# Patient Record
Sex: MALE | Race: WHITE | NOT HISPANIC OR LATINO | Employment: FULL TIME | URBAN - METROPOLITAN AREA
[De-identification: names, ages, dates, MRNs, and addresses within clinical notes are randomized per-mention and may not be internally consistent; named-entity substitution may affect disease eponyms.]

---

## 2022-09-13 ENCOUNTER — APPOINTMENT (OUTPATIENT)
Dept: RADIOLOGY | Facility: HOSPITAL | Age: 32
End: 2022-09-13
Payer: OTHER MISCELLANEOUS

## 2022-09-13 ENCOUNTER — HOSPITAL ENCOUNTER (EMERGENCY)
Facility: HOSPITAL | Age: 32
Discharge: HOME | End: 2022-09-13
Attending: EMERGENCY MEDICINE
Payer: OTHER MISCELLANEOUS

## 2022-09-13 VITALS
SYSTOLIC BLOOD PRESSURE: 137 MMHG | RESPIRATION RATE: 18 BRPM | TEMPERATURE: 98 F | OXYGEN SATURATION: 100 % | DIASTOLIC BLOOD PRESSURE: 89 MMHG | HEART RATE: 103 BPM

## 2022-09-13 DIAGNOSIS — M54.9 UPPER BACK PAIN: Primary | ICD-10-CM

## 2022-09-13 DIAGNOSIS — M54.6 THORACIC BACK PAIN: ICD-10-CM

## 2022-09-13 DIAGNOSIS — R13.10 DYSPHAGIA: ICD-10-CM

## 2022-09-13 PROCEDURE — 96372 THER/PROPH/DIAG INJ SC/IM: CPT

## 2022-09-13 PROCEDURE — 71046 X-RAY EXAM CHEST 2 VIEWS: CPT

## 2022-09-13 PROCEDURE — 99283 EMERGENCY DEPT VISIT LOW MDM: CPT

## 2022-09-13 PROCEDURE — 99284 EMERGENCY DEPT VISIT MOD MDM: CPT | Performed by: EMERGENCY MEDICINE

## 2022-09-13 RX ORDER — KETOROLAC TROMETHAMINE 30 MG/ML
15 INJECTION, SOLUTION INTRAMUSCULAR; INTRAVENOUS ONCE
Status: COMPLETED | OUTPATIENT
Start: 2022-09-13 | End: 2022-09-13

## 2022-09-13 RX ORDER — IBUPROFEN 800 MG/1
800 TABLET ORAL 3 TIMES DAILY
Qty: 21 TABLET | Refills: 0 | Status: SHIPPED | OUTPATIENT
Start: 2022-09-13

## 2022-09-13 RX ORDER — FAMOTIDINE 20 MG/1
20 TABLET, FILM COATED ORAL 2 TIMES DAILY
Qty: 30 TABLET | Refills: 0 | Status: SHIPPED | OUTPATIENT
Start: 2022-09-13

## 2022-09-13 RX ORDER — METHOCARBAMOL 500 MG/1
500 TABLET, FILM COATED ORAL ONCE
Status: COMPLETED | OUTPATIENT
Start: 2022-09-13 | End: 2022-09-13

## 2022-09-13 RX ORDER — METHOCARBAMOL 500 MG/1
500 TABLET, FILM COATED ORAL 2 TIMES DAILY
Qty: 20 TABLET | Refills: 0 | Status: SHIPPED | OUTPATIENT
Start: 2022-09-13

## 2022-09-13 RX ADMIN — METHOCARBAMOL 500 MG: 500 TABLET ORAL at 21:00

## 2022-09-13 RX ADMIN — KETOROLAC TROMETHAMINE 15 MG: 30 INJECTION, SOLUTION INTRAMUSCULAR at 21:00

## 2022-09-13 NOTE — Clinical Note
Saba Harden was seen and treated in our emergency department on 9/13/2022  No lifting greater than 10 lb for 5 days    Diagnosis:     Felicia Jones  may return to work on return date  He may return on this date: 09/15/2022         If you have any questions or concerns, please don't hesitate to call        Lindy Scott MD    ______________________________           _______________          _______________  Hospital Representative                              Date                                Time

## 2022-09-18 NOTE — ED PROVIDER NOTES
History  Chief Complaint   Patient presents with    Back Pain     Patient states"he hurt his back while lifting at work" Patient states"it hurts when he swallows and he is unsure if it is related"  50-year-old male presenting to emergency department for evaluation back pain  Patient states that he was lifting something heavy at work when he felt something pull, has aching mid back pain, with radiation towards the right shoulder  Denies any numbness weakness or tingling  He has full strength and sensation of his upper extremity  He has leg weakness  There is no radiation into the legs  No bowel or bladder incontinence no anesthesia  Patient also has sensation of dyspepsia/dysphagia, has had grand the past, notes that he feels that the symptoms have been getting worse since the injury, no nausea or vomiting  Still ago past balance stool without difficulty  None       History reviewed  No pertinent past medical history  History reviewed  No pertinent surgical history  History reviewed  No pertinent family history  I have reviewed and agree with the history as documented  E-Cigarette/Vaping    E-Cigarette Use Never User      E-Cigarette/Vaping Substances     Social History     Tobacco Use    Smoking status: Never Smoker    Smokeless tobacco: Never Used   Vaping Use    Vaping Use: Never used   Substance Use Topics    Alcohol use: Never    Drug use: Never       Review of Systems   Constitutional: Negative for appetite change, chills, fatigue and fever  HENT: Negative for sneezing and sore throat  Eyes: Negative for visual disturbance  Respiratory: Negative for cough, choking, chest tightness, shortness of breath and wheezing  Cardiovascular: Negative for chest pain and palpitations  Gastrointestinal: Negative for abdominal pain, constipation, diarrhea, nausea and vomiting  Genitourinary: Negative for difficulty urinating and dysuria     Musculoskeletal: Positive for back pain    Neurological: Negative for dizziness, weakness, light-headedness, numbness and headaches  All other systems reviewed and are negative  Physical Exam  Physical Exam  Vitals and nursing note reviewed  Constitutional:       General: He is not in acute distress  Appearance: He is well-developed  He is not diaphoretic  HENT:      Head: Normocephalic and atraumatic  Eyes:      Pupils: Pupils are equal, round, and reactive to light  Neck:      Vascular: No JVD  Trachea: No tracheal deviation  Cardiovascular:      Rate and Rhythm: Normal rate and regular rhythm  Heart sounds: Normal heart sounds  No murmur heard  No friction rub  No gallop  Pulmonary:      Effort: Pulmonary effort is normal  No respiratory distress  Breath sounds: Normal breath sounds  No wheezing or rales  Chest:      Comments: There is right-sided thoracic back tenderness, no midline tenderness  Abdominal:      General: Bowel sounds are normal  There is no distension  Palpations: Abdomen is soft  Tenderness: There is no abdominal tenderness  There is no guarding or rebound  Skin:     General: Skin is warm and dry  Coloration: Skin is not pale  Neurological:      Mental Status: He is alert and oriented to person, place, and time  Cranial Nerves: No cranial nerve deficit  Motor: No abnormal muscle tone     Psychiatric:         Behavior: Behavior normal          Vital Signs  ED Triage Vitals [09/13/22 2010]   Temperature Pulse Respirations Blood Pressure SpO2   98 °F (36 7 °C) 103 18 137/89 100 %      Temp src Heart Rate Source Patient Position - Orthostatic VS BP Location FiO2 (%)   -- -- -- -- --      Pain Score       8           Vitals:    09/13/22 2010   BP: 137/89   Pulse: 103         Visual Acuity      ED Medications  Medications   ketorolac (TORADOL) injection 15 mg (15 mg Intramuscular Given 9/13/22 2100)   methocarbamol (ROBAXIN) tablet 500 mg (500 mg Oral Given 9/13/22 2100)       Diagnostic Studies  Results Reviewed     None                 XR chest 2 views   Final Result by Keegan Marie MD (09/14 3682)      No acute cardiopulmonary disease  Workstation performed: [id number]                    Procedures  Procedures         ED Course                                             MDM  Number of Diagnoses or Management Options  Dysphagia  Thoracic back pain  Upper back pain  Diagnosis management comments: 69-year-old male with rest back pain, also has some dysphagia, patient is able to tolerate liquids and solids here, suspect that his symptoms are related to GERD, I do not believe that there is any traumatic cause of his symptoms at this time, will check x-ray, reassess  X-ray seems normal, no overt signs of large hiatal hernia  Recommended he follow-up with GI for endoscopy as he is scheduled  Recommend NSAIDs, muscle relaxers, rest, will provide a note for work  Also provide antacid medication  Disposition  Final diagnoses:   Upper back pain   Thoracic back pain   Dysphagia     Time reflects when diagnosis was documented in both MDM as applicable and the Disposition within this note     Time User Action Codes Description Comment    9/13/2022  9:25 PM [address] [M54 9] Upper back pain     9/13/2022  9:26 PM Susy Angulo [M54 6] Thoracic back pain     9/13/2022  9:31 PM Susy Angulo [R13 10] Dysphagia       ED Disposition     ED Disposition   Discharge    Condition   Stable    Date/Time   Tue Sep 13, 2022  9:25 PM    Comment   Isaac Yanes discharge to home/self care                 Follow-up Information    None         Discharge Medication List as of 9/13/2022  9:32 PM      START taking these medications    Details   famotidine (PEPCID) 20 mg tablet Take 1 tablet (20 mg total) by mouth 2 (two) times a day, Starting Tue 9/13/2022, Print      ibuprofen (MOTRIN) 800 mg tablet Take 1 tablet (800 mg total) by mouth 3 (three) times a day, Starting Tue 9/13/2022, Print      methocarbamol (ROBAXIN) 500 mg tablet Take 1 tablet (500 mg total) by mouth 2 (two) times a day, Starting Tue 9/13/2022, Print             No discharge procedures on file      PDMP Review     None          ED Provider  Electronically Signed by           Erik Canales MD  09/17/22 6165

## 2023-08-14 ENCOUNTER — HOSPITAL ENCOUNTER (EMERGENCY)
Facility: HOSPITAL | Age: 33
Discharge: HOME | End: 2023-08-14
Attending: EMERGENCY MEDICINE | Admitting: EMERGENCY MEDICINE
Payer: COMMERCIAL

## 2023-08-14 VITALS
DIASTOLIC BLOOD PRESSURE: 74 MMHG | HEIGHT: 72 IN | BODY MASS INDEX: 35.89 KG/M2 | SYSTOLIC BLOOD PRESSURE: 122 MMHG | TEMPERATURE: 98.3 F | WEIGHT: 265 LBS | HEART RATE: 87 BPM | OXYGEN SATURATION: 97 % | RESPIRATION RATE: 18 BRPM

## 2023-08-14 DIAGNOSIS — B34.1 COXSACKIE VIRUS INFECTION: Primary | ICD-10-CM

## 2023-08-14 DIAGNOSIS — L01.00 IMPETIGO: ICD-10-CM

## 2023-08-14 PROCEDURE — 99282 EMERGENCY DEPT VISIT SF MDM: CPT

## 2023-08-14 PROCEDURE — 99284 EMERGENCY DEPT VISIT MOD MDM: CPT | Performed by: EMERGENCY MEDICINE

## 2023-08-14 RX ORDER — PREDNISONE 20 MG/1
40 TABLET ORAL DAILY
Qty: 10 TABLET | Refills: 0 | Status: SHIPPED | OUTPATIENT
Start: 2023-08-14 | End: 2023-08-19

## 2023-08-14 RX ORDER — IBUPROFEN 800 MG/1
800 TABLET ORAL 3 TIMES DAILY
Qty: 21 TABLET | Refills: 0 | Status: SHIPPED | OUTPATIENT
Start: 2023-08-14

## 2023-08-14 NOTE — ED PROVIDER NOTES
History  Chief Complaint   Patient presents with   • Rash     Pt reports rash on his hands, feet, and ears that started Thursday night. Had fever Friday night and sore throat. 65-year-old male presenting to the emergency department for evaluation of rash. Patient noticed fever on Thursday night then he developed a rash on his bilateral palms and soles as well as a sore throat. Patient reports poor appetite and this time. Denies any chest pain shortness of breath or lightheadedness. Denies any syncope or presyncope. Prior to Admission Medications   Prescriptions Last Dose Informant Patient Reported? Taking?   famotidine (PEPCID) 20 mg tablet   No No   Sig: Take 1 tablet (20 mg total) by mouth 2 (two) times a day   ibuprofen (MOTRIN) 800 mg tablet   No No   Sig: Take 1 tablet (800 mg total) by mouth 3 (three) times a day   methocarbamol (ROBAXIN) 500 mg tablet   No No   Sig: Take 1 tablet (500 mg total) by mouth 2 (two) times a day      Facility-Administered Medications: None       History reviewed. No pertinent past medical history. History reviewed. No pertinent surgical history. History reviewed. No pertinent family history. I have reviewed and agree with the history as documented. E-Cigarette/Vaping   • E-Cigarette Use Never User      E-Cigarette/Vaping Substances     Social History     Tobacco Use   • Smoking status: Never   • Smokeless tobacco: Never   Vaping Use   • Vaping Use: Never used   Substance Use Topics   • Alcohol use: Never   • Drug use: Never       Review of Systems   Constitutional: Positive for fever. Negative for appetite change, chills and fatigue. HENT: Positive for sore throat. Negative for sneezing. Eyes: Negative for visual disturbance. Respiratory: Negative for cough, choking, chest tightness, shortness of breath and wheezing. Cardiovascular: Negative for chest pain and palpitations.    Gastrointestinal: Negative for abdominal pain, constipation, diarrhea, nausea and vomiting. Genitourinary: Negative for difficulty urinating and dysuria. Skin: Positive for rash. Neurological: Negative for dizziness, weakness, light-headedness, numbness and headaches. All other systems reviewed and are negative. Physical Exam  Physical Exam  Vitals and nursing note reviewed. Constitutional:       General: He is not in acute distress. Appearance: He is well-developed. He is not diaphoretic. HENT:      Head: Normocephalic and atraumatic. Eyes:      Pupils: Pupils are equal, round, and reactive to light. Neck:      Vascular: No JVD. Trachea: No tracheal deviation. Cardiovascular:      Rate and Rhythm: Normal rate and regular rhythm. Heart sounds: Normal heart sounds. No murmur heard. No friction rub. No gallop. Pulmonary:      Effort: Pulmonary effort is normal. No respiratory distress. Breath sounds: Normal breath sounds. No wheezing or rales. Abdominal:      General: Bowel sounds are normal. There is no distension. Palpations: Abdomen is soft. Tenderness: There is no abdominal tenderness. There is no guarding or rebound. Skin:     General: Skin is warm and dry. Coloration: Skin is not pale. Comments: Erythematous rash on palms of hands bilaterally. Patient deferred foot exam.   Neurological:      Mental Status: He is alert and oriented to person, place, and time. Cranial Nerves: No cranial nerve deficit. Motor: No abnormal muscle tone.    Psychiatric:         Behavior: Behavior normal.         Vital Signs  ED Triage Vitals [08/14/23 1520]   Temperature Pulse Respirations Blood Pressure SpO2   98.3 °F (36.8 °C) 87 18 122/74 97 %      Temp Source Heart Rate Source Patient Position - Orthostatic VS BP Location FiO2 (%)   Tympanic Monitor Sitting Left arm --      Pain Score       --           Vitals:    08/14/23 1520   BP: 122/74   Pulse: 87   Patient Position - Orthostatic VS: Sitting         Visual Acuity      ED Medications  Medications - No data to display    Diagnostic Studies  Results Reviewed     None                 No orders to display              Procedures  Procedures         ED Course                               SBIRT 22yo+    Flowsheet Row Most Recent Value   Initial Alcohol Screen: US AUDIT-C     1. How often do you have a drink containing alcohol? 0 Filed at: 08/14/2023 1624   2. How many drinks containing alcohol do you have on a typical day you are drinking? 0 Filed at: 08/14/2023 1624   3a. Male UNDER 65: How often do you have five or more drinks on one occasion? 0 Filed at: 08/14/2023 1624   3b. FEMALE Any Age, or MALE 65+: How often do you have 4 or more drinks on one occassion? 0 Filed at: 08/14/2023 1624   Audit-C Score 0 Filed at: 08/14/2023 1624   RAFFY: How many times in the past year have you. .. Used an illegal drug or used a prescription medication for non-medical reasons? Never Filed at: 08/14/2023 1624                    Medical Decision Making  50-year-old male with coxsackievirus. Recommend supportive care, also give steroids, recommend increasing fluids, PCP follow-up. Reviewed return precautions. Risk  Prescription drug management. Disposition  Final diagnoses:   Coxsackie virus infection   Impetigo     Time reflects when diagnosis was documented in both MDM as applicable and the Disposition within this note     Time User Action Codes Description Comment    8/14/2023  4:27 PM Ramiro Rivers [B34.1] Coxsackie virus infection     8/14/2023  4:29 PM Silvia Rivers [facility] [L01.00] Impetigo       ED Disposition     ED Disposition   Discharge    Condition   Stable    Date/Time   Mon Aug 14, 2023  4:27 PM    Comment   Isaac Mar discharge to home/self care.                Follow-up Information    None         Discharge Medication List as of 8/14/2023  4:30 PM      START taking these medications    Details   !! ibuprofen (MOTRIN) 800 mg tablet Take 1 tablet (800 mg total) by mouth 3 (three) times a day, Starting Mon 8/14/2023, Print      mupirocin (BACTROBAN) 2 % ointment Apply topically 3 (three) times a day, Starting Mon 8/14/2023, Print      predniSONE 20 mg tablet Take 2 tablets (40 mg total) by mouth daily for 5 days, Starting Mon 8/14/2023, Until Sat 8/19/2023, Print       !! - Potential duplicate medications found. Please discuss with provider. CONTINUE these medications which have NOT CHANGED    Details   famotidine (PEPCID) 20 mg tablet Take 1 tablet (20 mg total) by mouth 2 (two) times a day, Starting Tue 9/13/2022, Print      !! ibuprofen (MOTRIN) 800 mg tablet Take 1 tablet (800 mg total) by mouth 3 (three) times a day, Starting Tue 9/13/2022, Print      methocarbamol (ROBAXIN) 500 mg tablet Take 1 tablet (500 mg total) by mouth 2 (two) times a day, Starting Tue 9/13/2022, Print       !! - Potential duplicate medications found. Please discuss with provider. No discharge procedures on file.     PDMP Review     None          ED Provider  Electronically Signed by           Marilynn Mora MD  08/15/23 0157

## 2023-08-14 NOTE — Clinical Note
Nicky Olivares was seen and treated in our emergency department on 8/14/2023. Diagnosis:     Nasir Briscoe  may return to work on return date. He may return on this date: 08/21/2023         If you have any questions or concerns, please don't hesitate to call.       Michelle Villegas MD    ______________________________           _______________          _______________  Hospital Representative                              Date                                Time

## 2024-02-05 NOTE — TELEPHONE ENCOUNTER
I spoke with Binh Casiano, the  from [employer].  He provided me with the claim number, date of injury, and verified the claim is open and billable.

## 2024-02-06 ENCOUNTER — APPOINTMENT (OUTPATIENT)
Dept: RADIOLOGY | Facility: CLINIC | Age: 34
End: 2024-02-06
Payer: COMMERCIAL

## 2024-02-06 ENCOUNTER — OFFICE VISIT (OUTPATIENT)
Dept: OBGYN CLINIC | Facility: CLINIC | Age: 34
End: 2024-02-06
Payer: OTHER MISCELLANEOUS

## 2024-02-06 VITALS
BODY MASS INDEX: 38.9 KG/M2 | HEIGHT: 72 IN | SYSTOLIC BLOOD PRESSURE: 112 MMHG | DIASTOLIC BLOOD PRESSURE: 75 MMHG | HEART RATE: 102 BPM | WEIGHT: 287.2 LBS

## 2024-02-06 DIAGNOSIS — M25.511 RIGHT SHOULDER PAIN, UNSPECIFIED CHRONICITY: Primary | ICD-10-CM

## 2024-02-06 DIAGNOSIS — M25.511 RIGHT SHOULDER PAIN, UNSPECIFIED CHRONICITY: ICD-10-CM

## 2024-02-06 DIAGNOSIS — M75.101 TEAR OF RIGHT ROTATOR CUFF, UNSPECIFIED TEAR EXTENT, UNSPECIFIED WHETHER TRAUMATIC: ICD-10-CM

## 2024-02-06 PROCEDURE — 99203 OFFICE O/P NEW LOW 30 MIN: CPT | Performed by: ORTHOPAEDIC SURGERY

## 2024-02-06 PROCEDURE — 73030 X-RAY EXAM OF SHOULDER: CPT

## 2024-02-06 NOTE — LETTER
February 6, 2024     Patient: Isaac Yanes  YOB: 1990  Date of Visit: 2/6/2024      To Whom it May Concern:    Isaac Yanes is under my professional care. Isaac was seen in my office on 2/6/2024. Isaac should remain out of work at this time. This will be re-evaluated at his next follow visit after his upcoming MRI.     If you have any questions or concerns, please don't hesitate to call.         Sincerely,          Michele Saab MD        CC: No Recipients

## 2024-02-06 NOTE — PROGRESS NOTES
Orthopaedics Office Visit - New Patient Visit    ASSESSMENT/PLAN:    Assessment:   Right shoulder injury sustained 2/2/2024 suspicious for rotator cuff tear  Inability to bring arm to neutral level  3/5 resisted abduction strength    Plan:   Order placed today for STAT arthrogram MRI of right shoulder to further evaluate for rotator cuff tear, labral pathology  Order placed today for patient to initiate outpatient physical therapy for shoulder range of motion  Continue use of sling as desired  Oral analgesics as needed  Follow-up after MRI for review of results and determine appropriate treatment pathway at that time    To Do Next Visit:  Re-evaluation, MRI results    _____________________________________________________  CHIEF COMPLAINT:  Chief Complaint   Patient presents with    Right Shoulder - Pain         SUBJECTIVE:  Isaac Yanes is a 33 y.o. male who presents for initial evaluation of right shoulder injury sustained 2/2/2024 after pulling a refrigerator and feeling immediate pain. Did not feel any snap, crack, pop, or dislocation sensation. He presented to an urgent care where x-rays were obtained and he was told it was partially dislocated. He was prescribed NSAID and muscle relaxer which do provide some relief. Denies previous injury or surgery to right shoulder, paraesthesias. Notes a popping sensation with movement. He presents today in sling which does provide pain relief. Describes aching pain that is intermittent and rated 3/10 on average and 8/10 at its worst, which is with movement.     PAST MEDICAL HISTORY:  History reviewed. No pertinent past medical history.    PAST SURGICAL HISTORY:  History reviewed. No pertinent surgical history.    FAMILY HISTORY:  History reviewed. No pertinent family history.    SOCIAL HISTORY:  Social History     Tobacco Use    Smoking status: Never    Smokeless tobacco: Never   Vaping Use    Vaping status: Never Used   Substance Use Topics    Alcohol use: Never    Drug  "use: Never       MEDICATIONS:    Current Outpatient Medications:     ibuprofen (MOTRIN) 800 mg tablet, Take 1 tablet (800 mg total) by mouth 3 (three) times a day, Disp: 21 tablet, Rfl: 0    methocarbamol (ROBAXIN) 500 mg tablet, Take 1 tablet (500 mg total) by mouth 2 (two) times a day, Disp: 20 tablet, Rfl: 0    famotidine (PEPCID) 20 mg tablet, Take 1 tablet (20 mg total) by mouth 2 (two) times a day (Patient not taking: Reported on 2/6/2024), Disp: 30 tablet, Rfl: 0    ibuprofen (MOTRIN) 800 mg tablet, Take 1 tablet (800 mg total) by mouth 3 (three) times a day (Patient not taking: Reported on 2/6/2024), Disp: 21 tablet, Rfl: 0    mupirocin (BACTROBAN) 2 % ointment, Apply topically 3 (three) times a day (Patient not taking: Reported on 2/6/2024), Disp: 15 g, Rfl: 0    ALLERGIES:  No Known Allergies    REVIEW OF SYSTEMS:  MSK: right shoulder  Neuro: WNL  Pertinent items are otherwise noted in HPI.  A comprehensive review of systems was otherwise negative.    LABS:  HgA1c: No results found for: \"HGBA1C\"  BMP: No results found for: \"GLUCOSE\", \"CALCIUM\", \"NA\", \"K\", \"CO2\", \"CL\", \"BUN\", \"CREATININE\"  CBC: No components found for: \"CBC\"    _____________________________________________________  PHYSICAL EXAMINATION:  Vital signs: /75   Pulse 102   Ht 6' (1.829 m)   Wt 130 kg (287 lb 3.2 oz)   BMI 38.95 kg/m²   General: No acute distress, awake and alert  Psychiatric: Mood and affect appear appropriate  HEENT: Trachea Midline, No torticollis, no apparent facial trauma  Cardiovascular: No audible murmurs; Extremities appear perfused  Pulmonary: No audible wheezing or stridor  Skin: No open lesions; see further details (if any) below    MUSCULOSKELETAL EXAMINATION:  Right Shoulder Exam  Alignment / Posture:  Normal shoulder posture. Normal scapular position.  Inspection:  No swelling. No ecchymosis. No muscle atrophy. No deformity.  Palpation:   lateral and anterior tenderness.  ROM:   significant limitations in " all planes. ABD 30 Flexion 45  Strength:  Not tested.  Stability:  Not tested.  Tests: (+) Painful arc. (+) [place]. (+) Cross-body adduction.  Neurovascular:  Sensation intact in Ax/R/M/U nerve distributions. 2+ radial pulse. Brisk capillary refill in all fingertips.            _____________________________________________________  STUDIES REVIEWED:  I personally reviewed the images and interpretation is as follows:  X-ray of the right shoulder obtained today reviewed and demonstrates: No acute osseous abnormalities.  Notable superior migration of humeral head in relation to the glenoid.    PROCEDURES PERFORMED:  Procedures    Scribe Attestation      I,:  Arlene Gunn am acting as a scribe while in the presence of the attending physician.:       I,:  Michele Saab MD personally performed the services described in this documentation    as scribed in my presence.:

## 2024-02-07 ENCOUNTER — TELEPHONE (OUTPATIENT)
Age: 34
End: 2024-02-07

## 2024-02-07 NOTE — TELEPHONE ENCOUNTER
Caller: Priscilla nurse from [employer]    Doctor/Office:     CHANDRAKANT#: [phone]      What needs to be faxed: Office visit notes from 02/06/2024    ATTN to: Priscilla    Fax#: [phone]      Documents were successfully e-faxed

## 2024-02-08 NOTE — TELEPHONE ENCOUNTER
Caller: Hector Nurse Care Manager    Doctor/Office: Katiana    What needs to be faxed: Work Status Letter.    Faxed to # [phone]     Call back#: [phone]     Documents were successfully e-faxed

## 2024-02-08 NOTE — TELEPHONE ENCOUNTER
Caller: Samantha Nurse  [employer]    Doctor: Katiana    Reason for call: Asking that the MRI Script along with the [place] Guided Script be printed and faxed to her. (This will not go through correctly if send through [facility])    Fax# [phone]    Call back#: [phone]           Caller: Ngoc-Foreign Nurse Care Manager    Doctor/Office: Katiana    What needs to be faxed: Work Status Letter.    Faxed to # [phone]     Call back#: [phone]     Documents were successfully e-faxed

## 2024-02-12 ENCOUNTER — TELEPHONE (OUTPATIENT)
Age: 34
End: 2024-02-12

## 2024-02-12 NOTE — TELEPHONE ENCOUNTER
Caller: Samantha [employer]     Doctor: Katiana     Reason for call: The injury that the patient is being seen for is a [facility] case. The MRI arthrogram is not in their guidelines for testing to rule out rotator cuff tears. The request can be submitted to [facility] [place], if the arthrogram is definitely needed    Call back#: [phone]

## 2024-02-15 ENCOUNTER — TELEPHONE (OUTPATIENT)
Age: 34
End: 2024-02-15

## 2024-02-15 NOTE — TELEPHONE ENCOUNTER
Caller: Binh [employer]     Doctor/Office: Samantha STALLINGS#: [phone]    What needs to be faxed: JOHN note 2/6/24    ATTN to: Binh    Fax#: [phone]      Documents were successfully e-faxed

## 2024-02-15 NOTE — TELEPHONE ENCOUNTER
Caller: Will @ [employer]    Doctor/Office: Katiana/néstor    CB#: n/a      What needs to be faxed: JOHN 2/6/2024    ATTN to: Claim # [id number]    Fax#: [phone]    This is the 2nd attempt to fax. This is needed by today.  Documents were successfully e-faxed

## 2024-02-19 ENCOUNTER — HOSPITAL ENCOUNTER (OUTPATIENT)
Dept: RADIOLOGY | Facility: HOSPITAL | Age: 34
Discharge: HOME | End: 2024-02-19
Attending: ORTHOPAEDIC SURGERY

## 2024-03-07 ENCOUNTER — TELEPHONE (OUTPATIENT)
Age: 34
End: 2024-03-07

## 2024-03-07 NOTE — TELEPHONE ENCOUNTER
Caller: Self    Doctor: Katiana    Reason for call: Patient wanted to know is provider had a chance to review MRI results and can he have a call back to discuss    Call back#: [phone]

## 2024-03-11 ENCOUNTER — TELEPHONE (OUTPATIENT)
Dept: OBGYN CLINIC | Facility: MEDICAL CENTER | Age: 34
End: 2024-03-11

## 2024-03-11 NOTE — TELEPHONE ENCOUNTER
Caller: patient     Doctor: augustin    Reason for call: Patient calling for update on MRI results and would like call back to discuss     Call back#: [phone]

## 2024-03-25 ENCOUNTER — TELEPHONE (OUTPATIENT)
Age: 34
End: 2024-03-25

## 2024-03-25 NOTE — TELEPHONE ENCOUNTER
Caller: Patient    Doctor: Katiana    Reason for call: Patient stated he missed work 3/25 due to not being able to access the work note. Requested an updated note to include today as a day off with a new return to work date of 3/27? Please email the updated letter    Call back#: [phone]

## 2024-03-26 ENCOUNTER — TELEPHONE (OUTPATIENT)
Age: 34
End: 2024-03-26

## 2024-03-26 NOTE — TELEPHONE ENCOUNTER
Caller: Johnathan from [employer]     Doctor: Katiana    Reason for call: Confirmed date of last appt.     Call back#: [phone]

## 2024-03-27 ENCOUNTER — TELEPHONE (OUTPATIENT)
Age: 34
End: 2024-03-27

## 2024-03-27 NOTE — TELEPHONE ENCOUNTER
Caller: Samantha[facility] [employer]    Doctor: Katiana    Reason for call: calling to see if report she faxed yesterday was received. I advised as of not no.    Call back#: n/a

## 2024-04-12 ENCOUNTER — TELEPHONE (OUTPATIENT)
Age: 34
End: 2024-04-12